# Patient Record
Sex: FEMALE | Race: BLACK OR AFRICAN AMERICAN
[De-identification: names, ages, dates, MRNs, and addresses within clinical notes are randomized per-mention and may not be internally consistent; named-entity substitution may affect disease eponyms.]

---

## 2022-04-27 ENCOUNTER — HOSPITAL ENCOUNTER (EMERGENCY)
Dept: HOSPITAL 12 - ER | Age: 87
Discharge: HOME | End: 2022-04-27
Payer: COMMERCIAL

## 2022-04-27 VITALS — DIASTOLIC BLOOD PRESSURE: 77 MMHG | SYSTOLIC BLOOD PRESSURE: 137 MMHG

## 2022-04-27 VITALS — HEIGHT: 60 IN | WEIGHT: 152 LBS | BODY MASS INDEX: 29.84 KG/M2

## 2022-04-27 DIAGNOSIS — K58.9: ICD-10-CM

## 2022-04-27 DIAGNOSIS — K57.32: Primary | ICD-10-CM

## 2022-04-27 DIAGNOSIS — Z85.6: ICD-10-CM

## 2022-04-27 LAB
ALP SERPL-CCNC: 249 U/L (ref 50–136)
ALT SERPL W/O P-5'-P-CCNC: 49 U/L (ref 14–59)
AST SERPL-CCNC: 39 U/L (ref 15–37)
BILIRUB DIRECT SERPL-MCNC: 0.1 MG/DL (ref 0–0.2)
BILIRUB SERPL-MCNC: 0.4 MG/DL (ref 0.2–1)
BUN SERPL-MCNC: 21 MG/DL (ref 7–18)
CHLORIDE SERPL-SCNC: 104 MMOL/L (ref 98–107)
CO2 SERPL-SCNC: 28 MMOL/L (ref 21–32)
CREAT SERPL-MCNC: 0.7 MG/DL (ref 0.6–1.3)
GLUCOSE SERPL-MCNC: 100 MG/DL (ref 74–106)
HCT VFR BLD AUTO: 33.8 % (ref 31.2–41.9)
LIPASE SERPL-CCNC: 89 U/L (ref 73–393)
MCH RBC QN AUTO: 32.9 UUG (ref 24.7–32.8)
MCV RBC AUTO: 95.8 FL (ref 75.5–95.3)
PLATELET # BLD AUTO: 228 K/UL (ref 179–408)
POTASSIUM SERPL-SCNC: 4.3 MMOL/L (ref 3.5–5.1)
WS STN SPEC: 6.3 G/DL (ref 6.4–8.2)

## 2022-04-27 PROCEDURE — 99284 EMERGENCY DEPT VISIT MOD MDM: CPT

## 2022-04-27 PROCEDURE — 74176 CT ABD & PELVIS W/O CONTRAST: CPT

## 2022-04-27 PROCEDURE — A4663 DIALYSIS BLOOD PRESSURE CUFF: HCPCS

## 2022-04-27 PROCEDURE — 80048 BASIC METABOLIC PNL TOTAL CA: CPT

## 2022-04-27 PROCEDURE — 85025 COMPLETE CBC W/AUTO DIFF WBC: CPT

## 2022-04-27 PROCEDURE — 80076 HEPATIC FUNCTION PANEL: CPT

## 2022-04-27 PROCEDURE — 96374 THER/PROPH/DIAG INJ IV PUSH: CPT

## 2022-04-27 PROCEDURE — 83690 ASSAY OF LIPASE: CPT

## 2022-04-27 PROCEDURE — 96361 HYDRATE IV INFUSION ADD-ON: CPT

## 2022-04-27 NOTE — NUR
Patient discharged to home in stable condition.  Written and verbal after care 
instructions given. 

Patient and pt's daughter verbalize understanding of instructions. Stressed 
follow up or return to ER for worsening s/s.

## 2022-05-26 ENCOUNTER — HOSPITAL ENCOUNTER (INPATIENT)
Dept: HOSPITAL 12 - ER | Age: 87
LOS: 6 days | Discharge: HOME | DRG: 177 | End: 2022-06-01
Attending: INTERNAL MEDICINE
Payer: COMMERCIAL

## 2022-05-26 VITALS — WEIGHT: 144.06 LBS | HEIGHT: 66 IN | BODY MASS INDEX: 23.15 KG/M2

## 2022-05-26 DIAGNOSIS — E43: ICD-10-CM

## 2022-05-26 DIAGNOSIS — N39.0: ICD-10-CM

## 2022-05-26 DIAGNOSIS — M47.812: ICD-10-CM

## 2022-05-26 DIAGNOSIS — D68.59: ICD-10-CM

## 2022-05-26 DIAGNOSIS — M19.90: ICD-10-CM

## 2022-05-26 DIAGNOSIS — E87.6: ICD-10-CM

## 2022-05-26 DIAGNOSIS — Z74.09: ICD-10-CM

## 2022-05-26 DIAGNOSIS — E83.42: ICD-10-CM

## 2022-05-26 DIAGNOSIS — K57.30: ICD-10-CM

## 2022-05-26 DIAGNOSIS — M10.9: ICD-10-CM

## 2022-05-26 DIAGNOSIS — Z86.73: ICD-10-CM

## 2022-05-26 DIAGNOSIS — D72.819: ICD-10-CM

## 2022-05-26 DIAGNOSIS — G92.8: ICD-10-CM

## 2022-05-26 DIAGNOSIS — U07.1: Primary | ICD-10-CM

## 2022-05-26 DIAGNOSIS — D69.6: ICD-10-CM

## 2022-05-26 DIAGNOSIS — F01.50: ICD-10-CM

## 2022-05-26 DIAGNOSIS — M47.9: ICD-10-CM

## 2022-05-26 DIAGNOSIS — B96.89: ICD-10-CM

## 2022-05-26 LAB
ALP SERPL-CCNC: 271 U/L (ref 50–136)
ALT SERPL W/O P-5'-P-CCNC: 27 U/L (ref 14–59)
AMPHETAMINES UR QL SCN>1000 NG/ML: NEGATIVE
APAP SERPL-MCNC: < 2 UG/ML (ref 10–30)
APPEARANCE UR: CLEAR
AST SERPL-CCNC: 29 U/L (ref 15–37)
BILIRUB DIRECT SERPL-MCNC: 0.1 MG/DL (ref 0–0.2)
BILIRUB SERPL-MCNC: 0.4 MG/DL (ref 0.2–1)
BILIRUB UR QL STRIP: NEGATIVE
BUN SERPL-MCNC: 15 MG/DL (ref 7–18)
CHLORIDE SERPL-SCNC: 104 MMOL/L (ref 98–107)
CO2 SERPL-SCNC: 27 MMOL/L (ref 21–32)
COCAINE UR QL SCN: NEGATIVE
COLOR UR: YELLOW
CREAT SERPL-MCNC: 0.7 MG/DL (ref 0.6–1.3)
DEPRECATED SQUAMOUS URNS QL MICRO: (no result) /HPF
ETHANOL SERPL-MCNC: < 3 MG/DL (ref 0–0)
GLUCOSE SERPL-MCNC: 102 MG/DL (ref 74–106)
GLUCOSE UR STRIP-MCNC: NEGATIVE MG/DL
HCT VFR BLD AUTO: 36.1 % (ref 31.2–41.9)
HGB UR QL STRIP: (no result)
KETONES UR STRIP-MCNC: NEGATIVE MG/DL
LEUKOCYTE ESTERASE UR QL STRIP: NEGATIVE
LIPASE SERPL-CCNC: 94 U/L (ref 73–393)
MCH RBC QN AUTO: 32.7 UUG (ref 24.7–32.8)
MCV RBC AUTO: 98.3 FL (ref 75.5–95.3)
NITRITE UR QL STRIP: NEGATIVE
OPIATES UR QL SCN: NEGATIVE
PCP UR QL SCN>25 NG/ML: NEGATIVE
PH UR STRIP: 7 [PH] (ref 5–8)
PLATELET # BLD AUTO: 151 K/UL (ref 179–408)
POTASSIUM SERPL-SCNC: 4 MMOL/L (ref 3.5–5.1)
RBC #/AREA URNS HPF: (no result) /HPF (ref 0–3)
SP GR UR STRIP: 1.02 (ref 1–1.03)
THC UR QL SCN>50 NG/ML: NEGATIVE
UROBILINOGEN UR STRIP-MCNC: >=8 E.U./DL
WBC #/AREA URNS HPF: (no result) /HPF
WBC #/AREA URNS HPF: (no result) /HPF (ref 0–3)
WS STN SPEC: 6.5 G/DL (ref 6.4–8.2)

## 2022-05-26 PROCEDURE — G0378 HOSPITAL OBSERVATION PER HR: HCPCS

## 2022-05-26 PROCEDURE — C1758 CATHETER, URETERAL: HCPCS

## 2022-05-26 PROCEDURE — A4663 DIALYSIS BLOOD PRESSURE CUFF: HCPCS

## 2022-05-26 PROCEDURE — G0480 DRUG TEST DEF 1-7 CLASSES: HCPCS

## 2022-05-27 VITALS — SYSTOLIC BLOOD PRESSURE: 157 MMHG | DIASTOLIC BLOOD PRESSURE: 78 MMHG

## 2022-05-27 VITALS — DIASTOLIC BLOOD PRESSURE: 66 MMHG | SYSTOLIC BLOOD PRESSURE: 138 MMHG

## 2022-05-27 VITALS — DIASTOLIC BLOOD PRESSURE: 78 MMHG | SYSTOLIC BLOOD PRESSURE: 157 MMHG

## 2022-05-27 VITALS — DIASTOLIC BLOOD PRESSURE: 77 MMHG | SYSTOLIC BLOOD PRESSURE: 136 MMHG

## 2022-05-27 VITALS — SYSTOLIC BLOOD PRESSURE: 141 MMHG | DIASTOLIC BLOOD PRESSURE: 74 MMHG

## 2022-05-27 VITALS — DIASTOLIC BLOOD PRESSURE: 79 MMHG | SYSTOLIC BLOOD PRESSURE: 141 MMHG

## 2022-05-27 LAB
ALP SERPL-CCNC: 262 U/L (ref 50–136)
ALT SERPL W/O P-5'-P-CCNC: 29 U/L (ref 14–59)
AST SERPL-CCNC: 35 U/L (ref 15–37)
BILIRUB SERPL-MCNC: 0.5 MG/DL (ref 0.2–1)
BUN SERPL-MCNC: 11 MG/DL (ref 7–18)
CHLORIDE SERPL-SCNC: 104 MMOL/L (ref 98–107)
CO2 SERPL-SCNC: 27 MMOL/L (ref 21–32)
CREAT SERPL-MCNC: 0.7 MG/DL (ref 0.6–1.3)
FERRITIN SERPL-MCNC: 392 NG/ML (ref 8–252)
GLUCOSE SERPL-MCNC: 85 MG/DL (ref 74–106)
HCT VFR BLD AUTO: 33.9 % (ref 31.2–41.9)
LDH SERPL L TO P-CCNC: 302 U/L (ref 81–234)
LYMPHOCYTES NFR BLD MANUAL: 43 % (ref 20–40)
MAGNESIUM SERPL-MCNC: 1.6 MG/DL (ref 1.8–2.4)
MCH RBC QN AUTO: 32.8 UUG (ref 24.7–32.8)
MCV RBC AUTO: 94 FL (ref 75.5–95.3)
METAMYELOCYTES NFR BLD MANUAL: 1 % (ref 0–1)
MONOCYTES NFR BLD MANUAL: 7 % (ref 2–10)
NEUTS BAND NFR BLD MANUAL: 3 % (ref 0–10)
NEUTS SEG NFR BLD MANUAL: 0 % (ref 42–75)
NEUTS SEG NFR BLD MANUAL: 46 % (ref 42–75)
PLATELET # BLD AUTO: 175 K/UL (ref 179–408)
POTASSIUM SERPL-SCNC: 3.6 MMOL/L (ref 3.5–5.1)
WS STN SPEC: 6.5 G/DL (ref 6.4–8.2)

## 2022-05-27 RX ADMIN — PANTOPRAZOLE SODIUM SCH MG: 40 TABLET, DELAYED RELEASE ORAL at 06:08

## 2022-05-27 RX ADMIN — ENOXAPARIN SODIUM SCH MG: 40 INJECTION SUBCUTANEOUS at 21:09

## 2022-05-27 RX ADMIN — MAGNESIUM SULFATE IN DEXTROSE SCH MLS/HR: 10 INJECTION, SOLUTION INTRAVENOUS at 09:39

## 2022-05-27 RX ADMIN — ENOXAPARIN SODIUM SCH MG: 40 INJECTION SUBCUTANEOUS at 02:49

## 2022-05-27 RX ADMIN — MAGNESIUM SULFATE IN DEXTROSE SCH MLS/HR: 10 INJECTION, SOLUTION INTRAVENOUS at 10:46

## 2022-05-27 NOTE — NUR
Admitted pt to [facility] from ER accompanied by ER nurse via zachariah. She is alert and oriented 
to self, confused. On room air with no respiratory distress noted. No s/sx of pain and 
discomfort noted. Saline lock on R hand #22 patent and intact. Initial assessment and full 
body assessment done. All needs attended. Safety precautions observed. Call light placed 
within reach. Will continue to monitor.

## 2022-05-27 NOTE — NUR
Pt slept intermittently throughout the night. No respiratory distress noted and remains 
afebrile. Ambulatory, assisted to the toilet multiple times, noted to be unsteady. All needs 
attended. Call light placed within reach. Will endorse to next shift.

## 2022-05-27 NOTE — NUR
Pt remain AAO x 1 confused and forgetful. No respiratory distress noted  at room air, 
remains afebrile. Ambulatory, assisted to the toilet multiple times using FWW , noted to be 
unsteady on her feet. on droplet contact isolation for + Covid 19. needs attended. Call 
light placed within reach. Will continue to monitor closely for Comfort and safety.

## 2022-05-28 VITALS — DIASTOLIC BLOOD PRESSURE: 79 MMHG | SYSTOLIC BLOOD PRESSURE: 140 MMHG

## 2022-05-28 VITALS — DIASTOLIC BLOOD PRESSURE: 60 MMHG | SYSTOLIC BLOOD PRESSURE: 105 MMHG

## 2022-05-28 VITALS — SYSTOLIC BLOOD PRESSURE: 134 MMHG | DIASTOLIC BLOOD PRESSURE: 110 MMHG

## 2022-05-28 VITALS — SYSTOLIC BLOOD PRESSURE: 129 MMHG | DIASTOLIC BLOOD PRESSURE: 64 MMHG

## 2022-05-28 LAB
BUN SERPL-MCNC: 11 MG/DL (ref 7–18)
CHLORIDE SERPL-SCNC: 105 MMOL/L (ref 98–107)
CO2 SERPL-SCNC: 28 MMOL/L (ref 21–32)
CREAT SERPL-MCNC: 0.6 MG/DL (ref 0.6–1.3)
GLUCOSE SERPL-MCNC: 86 MG/DL (ref 74–106)
HCT VFR BLD AUTO: 33.3 % (ref 31.2–41.9)
MAGNESIUM SERPL-MCNC: 1.8 MG/DL (ref 1.8–2.4)
MCH RBC QN AUTO: 32.5 UUG (ref 24.7–32.8)
MCV RBC AUTO: 95.1 FL (ref 75.5–95.3)
PLATELET # BLD AUTO: 168 K/UL (ref 179–408)
POTASSIUM SERPL-SCNC: 3.4 MMOL/L (ref 3.5–5.1)

## 2022-05-28 RX ADMIN — ALLOPURINOL SCH MG: 100 TABLET ORAL at 09:11

## 2022-05-28 RX ADMIN — PANTOPRAZOLE SODIUM SCH MG: 40 TABLET, DELAYED RELEASE ORAL at 06:07

## 2022-05-28 RX ADMIN — POLYETHYLENE GLYCOL 3350 SCH GM: 17 POWDER, FOR SOLUTION ORAL at 09:10

## 2022-05-28 RX ADMIN — Medication SCH ML: at 17:47

## 2022-05-28 RX ADMIN — ENOXAPARIN SODIUM SCH MG: 40 INJECTION SUBCUTANEOUS at 20:46

## 2022-05-28 NOTE — NUR
Patient AAO x 1 confused and forgetful. Re-oriented patient to place/unit.On room air, no 
s/s of distress noted.remains afebrile. Continue  on droplet contact isolation for + Covid 
19. IV patent and intact on left FA 20g . Call light placed within reach. Will continue to 
monitor .

## 2022-05-28 NOTE — NUR
Pt slept intermittently throughout the night. No respiratory distress noted and remains 
afebrile. Ambulatory with assist to the toilet and had one episode of BM. All needs 
attended. Call light placed within reach. Will endorse to next shift.

## 2022-05-29 VITALS — SYSTOLIC BLOOD PRESSURE: 115 MMHG | DIASTOLIC BLOOD PRESSURE: 71 MMHG

## 2022-05-29 VITALS — DIASTOLIC BLOOD PRESSURE: 68 MMHG | SYSTOLIC BLOOD PRESSURE: 131 MMHG

## 2022-05-29 VITALS — SYSTOLIC BLOOD PRESSURE: 121 MMHG | DIASTOLIC BLOOD PRESSURE: 82 MMHG

## 2022-05-29 VITALS — SYSTOLIC BLOOD PRESSURE: 128 MMHG | DIASTOLIC BLOOD PRESSURE: 68 MMHG

## 2022-05-29 LAB
BUN SERPL-MCNC: 15 MG/DL (ref 7–18)
CHLORIDE SERPL-SCNC: 104 MMOL/L (ref 98–107)
CO2 SERPL-SCNC: 28 MMOL/L (ref 21–32)
CREAT SERPL-MCNC: 0.6 MG/DL (ref 0.6–1.3)
GLUCOSE SERPL-MCNC: 90 MG/DL (ref 74–106)
POTASSIUM SERPL-SCNC: 3.9 MMOL/L (ref 3.5–5.1)

## 2022-05-29 RX ADMIN — ENOXAPARIN SODIUM SCH MG: 40 INJECTION SUBCUTANEOUS at 20:23

## 2022-05-29 RX ADMIN — Medication SCH ML: at 12:42

## 2022-05-29 RX ADMIN — ALLOPURINOL SCH MG: 100 TABLET ORAL at 09:15

## 2022-05-29 RX ADMIN — PANTOPRAZOLE SODIUM SCH MG: 40 TABLET, DELAYED RELEASE ORAL at 06:11

## 2022-05-29 RX ADMIN — Medication SCH ML: at 09:17

## 2022-05-29 RX ADMIN — Medication SCH ML: at 17:45

## 2022-05-29 RX ADMIN — POLYETHYLENE GLYCOL 3350 SCH GM: 17 POWDER, FOR SOLUTION ORAL at 09:16

## 2022-05-29 NOTE — NUR
awake, oriented to name only, pleasantly confused. reoriented. no ss of pain or sob. cont on 
droplet/contact isolation dt positive covid. noted with intermittent non productive 
coughing. comfortable on room air. safety maintained.

## 2022-05-30 VITALS — DIASTOLIC BLOOD PRESSURE: 67 MMHG | SYSTOLIC BLOOD PRESSURE: 121 MMHG

## 2022-05-30 VITALS — SYSTOLIC BLOOD PRESSURE: 114 MMHG | DIASTOLIC BLOOD PRESSURE: 81 MMHG

## 2022-05-30 VITALS — DIASTOLIC BLOOD PRESSURE: 76 MMHG | SYSTOLIC BLOOD PRESSURE: 149 MMHG

## 2022-05-30 VITALS — DIASTOLIC BLOOD PRESSURE: 58 MMHG | SYSTOLIC BLOOD PRESSURE: 146 MMHG

## 2022-05-30 RX ADMIN — Medication SCH ML: at 17:21

## 2022-05-30 RX ADMIN — ENOXAPARIN SODIUM SCH MG: 40 INJECTION SUBCUTANEOUS at 21:05

## 2022-05-30 RX ADMIN — ALLOPURINOL SCH MG: 100 TABLET ORAL at 09:22

## 2022-05-30 RX ADMIN — POLYETHYLENE GLYCOL 3350 SCH GM: 17 POWDER, FOR SOLUTION ORAL at 09:22

## 2022-05-30 RX ADMIN — Medication SCH ML: at 09:22

## 2022-05-30 RX ADMIN — PANTOPRAZOLE SODIUM SCH MG: 40 TABLET, DELAYED RELEASE ORAL at 06:15

## 2022-05-30 RX ADMIN — Medication SCH ML: at 13:11

## 2022-05-30 NOTE — NUR
PAtient slept well.On ra.Saturating well. NO significant event during the shift. VSS.All 
needs anticipated  and met accordingly.Will endorse to oncoming shift.

## 2022-05-30 NOTE — NUR
Received patient in bed awake and alert only to self.  Pt is on covid isolation. Pt is on 
room air and denies any SOB.  Safety measures are implemented.  Will continue to monitor.

## 2022-05-31 VITALS — SYSTOLIC BLOOD PRESSURE: 145 MMHG | DIASTOLIC BLOOD PRESSURE: 76 MMHG

## 2022-05-31 VITALS — SYSTOLIC BLOOD PRESSURE: 147 MMHG | DIASTOLIC BLOOD PRESSURE: 76 MMHG

## 2022-05-31 VITALS — SYSTOLIC BLOOD PRESSURE: 151 MMHG | DIASTOLIC BLOOD PRESSURE: 81 MMHG

## 2022-05-31 VITALS — SYSTOLIC BLOOD PRESSURE: 154 MMHG | DIASTOLIC BLOOD PRESSURE: 79 MMHG

## 2022-05-31 RX ADMIN — ALLOPURINOL SCH MG: 100 TABLET ORAL at 08:39

## 2022-05-31 RX ADMIN — Medication SCH ML: at 17:29

## 2022-05-31 RX ADMIN — PANTOPRAZOLE SODIUM SCH MG: 40 TABLET, DELAYED RELEASE ORAL at 06:19

## 2022-05-31 RX ADMIN — ENOXAPARIN SODIUM SCH MG: 40 INJECTION SUBCUTANEOUS at 21:49

## 2022-05-31 RX ADMIN — Medication SCH ML: at 08:40

## 2022-05-31 RX ADMIN — Medication SCH ML: at 13:10

## 2022-05-31 RX ADMIN — POLYETHYLENE GLYCOL 3350 SCH GM: 17 POWDER, FOR SOLUTION ORAL at 08:39

## 2022-05-31 NOTE — NUR
Received patient awake, seated in bed, alert to name only, no complain of pain, no sob 
noted, remain on droplet precaution due Covid +. Patient was kept clean and dry, cont to 
monitor.

## 2022-05-31 NOTE — NUR
RECEIVED PATIENT IN BED. AWAKE, WITH EPISODES OF CONFUSING. IV ACCESS PATENT AND INTACT. 
SHOWS NO SIGNS OF SOB, CHEST PAIN OR DIZZINESS. SAFETY PRECAUTIONS INITIATED. PATIENT 
CLOSELY MONITORED.

## 2022-06-01 VITALS — DIASTOLIC BLOOD PRESSURE: 85 MMHG | SYSTOLIC BLOOD PRESSURE: 146 MMHG

## 2022-06-01 VITALS — DIASTOLIC BLOOD PRESSURE: 89 MMHG | SYSTOLIC BLOOD PRESSURE: 159 MMHG

## 2022-06-01 VITALS — SYSTOLIC BLOOD PRESSURE: 142 MMHG | DIASTOLIC BLOOD PRESSURE: 80 MMHG

## 2022-06-01 LAB
ALP SERPL-CCNC: 254 U/L (ref 50–136)
ALT SERPL W/O P-5'-P-CCNC: 27 U/L (ref 14–59)
AST SERPL-CCNC: 31 U/L (ref 15–37)
BILIRUB SERPL-MCNC: 0.7 MG/DL (ref 0.2–1)
BUN SERPL-MCNC: 21 MG/DL (ref 7–18)
CHLORIDE SERPL-SCNC: 99 MMOL/L (ref 98–107)
CO2 SERPL-SCNC: 26 MMOL/L (ref 21–32)
CREAT SERPL-MCNC: 0.6 MG/DL (ref 0.6–1.3)
GLUCOSE SERPL-MCNC: 122 MG/DL (ref 74–106)
HCT VFR BLD AUTO: 37.1 % (ref 31.2–41.9)
MAGNESIUM SERPL-MCNC: 1.9 MG/DL (ref 1.8–2.4)
MCH RBC QN AUTO: 32.4 UUG (ref 24.7–32.8)
MCV RBC AUTO: 93.5 FL (ref 75.5–95.3)
PHOSPHATE SERPL-MCNC: 4 MG/DL (ref 2.5–4.9)
PLATELET # BLD AUTO: 169 K/UL (ref 179–408)
POTASSIUM SERPL-SCNC: 4.2 MMOL/L (ref 3.5–5.1)
WS STN SPEC: 6.9 G/DL (ref 6.4–8.2)

## 2022-06-01 RX ADMIN — POLYETHYLENE GLYCOL 3350 SCH GM: 17 POWDER, FOR SOLUTION ORAL at 08:31

## 2022-06-01 RX ADMIN — ALLOPURINOL SCH MG: 100 TABLET ORAL at 08:31

## 2022-06-01 RX ADMIN — Medication SCH ML: at 18:12

## 2022-06-01 RX ADMIN — PANTOPRAZOLE SODIUM SCH MG: 40 TABLET, DELAYED RELEASE ORAL at 06:04

## 2022-06-01 RX ADMIN — Medication SCH ML: at 12:59

## 2022-06-01 RX ADMIN — Medication SCH ML: at 08:31

## 2022-06-01 NOTE — NUR
Daughter called 2nd time to state patient is more confused and believes she would benefit 
more by  being home with her family. Daughter requested a call from MD and discharge if 
possible for today.

## 2022-06-01 NOTE — NUR
Patient discharged from [facility], dx covid 19. Patient remains weak with unclear speech. 
Patient able to transfer with 2 person max assist. Discharged with all her belongings, IV to 
left hand DC. Discharge instructions given to daughter Fely and patient assisted into 
private car.

## 2022-06-01 NOTE — NUR
PATIENT SLEPT THROUGH THE NIGHT WITH NO COMPLAINS. ON ROOM AIR. IV ACCESS PATENT AND INTACT. 
 ALL NEEDS ATTENDED TO AND MET. SAFETY PRECAUTIONS MAINTAINED. ENDORSED TO DAY SHIFT.

## 2022-06-01 NOTE — NUR
Stat chest xray ordered by Dr. Rutledge. MD aware that patients, daughter, Rani, would 
like to speak to him.

## 2022-06-01 NOTE — NUR
Patient is weak, easily arousable but with low tone unclear speech. Spoke with daughter 
Jennifer while in the room with patient. Daughter states mother is alert but when she has a 
long day, like the one she had yesterday when having an MRI, she tends to shut down and 
wants to stay quiet. patient able to speak to daughters on the phone for some time. PO 
appetite is low, Ensure supplements continued as well as one on one feeding.